# Patient Record
Sex: FEMALE | Race: BLACK OR AFRICAN AMERICAN | NOT HISPANIC OR LATINO | Employment: UNEMPLOYED | URBAN - METROPOLITAN AREA
[De-identification: names, ages, dates, MRNs, and addresses within clinical notes are randomized per-mention and may not be internally consistent; named-entity substitution may affect disease eponyms.]

---

## 2021-06-08 PROBLEM — S31.139A GUNSHOT WOUND OF ABDOMEN: Status: ACTIVE | Noted: 2021-06-08

## 2021-06-08 PROBLEM — S31.109A PENETRATING ABDOMINAL TRAUMA: Status: ACTIVE | Noted: 2021-06-08

## 2021-06-08 PROBLEM — Z98.890 STATUS POST EXPLORATORY LAPAROTOMY: Status: ACTIVE | Noted: 2021-06-08

## 2021-06-09 PROBLEM — R57.8 HEMORRHAGIC SHOCK: Status: ACTIVE | Noted: 2021-06-08

## 2021-06-12 PROBLEM — R45.1 AGITATION: Status: ACTIVE | Noted: 2021-06-12

## 2021-06-13 PROBLEM — R57.8 HEMORRHAGIC SHOCK: Status: RESOLVED | Noted: 2021-06-08 | Resolved: 2021-06-13

## 2021-06-15 PROBLEM — R50.9 FEVER: Status: ACTIVE | Noted: 2021-06-15

## 2021-06-15 PROBLEM — E87.6 HYPOKALEMIA: Status: ACTIVE | Noted: 2021-06-15

## 2021-06-15 PROBLEM — Z90.49 S/P SMALL BOWEL RESECTION: Status: ACTIVE | Noted: 2021-06-15

## 2021-06-16 PROBLEM — D62 ACUTE BLOOD LOSS ANEMIA: Status: ACTIVE | Noted: 2021-06-16

## 2021-06-16 PROBLEM — R45.1 AGITATION: Status: RESOLVED | Noted: 2021-06-12 | Resolved: 2021-06-16

## 2021-06-16 PROBLEM — R50.9 FEVER: Status: RESOLVED | Noted: 2021-06-15 | Resolved: 2021-06-16

## 2021-06-17 PROBLEM — I26.99 ACUTE PULMONARY EMBOLISM: Status: ACTIVE | Noted: 2021-06-17

## 2021-06-17 PROBLEM — J96.01 ACUTE HYPOXEMIC RESPIRATORY FAILURE: Status: ACTIVE | Noted: 2021-06-17

## 2021-06-18 PROBLEM — Z90.49 S/P SMALL BOWEL RESECTION: Status: RESOLVED | Noted: 2021-06-15 | Resolved: 2021-06-18

## 2021-06-18 PROBLEM — Z98.890 STATUS POST EXPLORATORY LAPAROTOMY: Status: RESOLVED | Noted: 2021-06-08 | Resolved: 2021-06-18

## 2021-06-20 PROBLEM — R11.0 NAUSEA: Status: ACTIVE | Noted: 2021-06-20

## 2021-06-24 PROBLEM — R19.8 ABNORMAL FINDINGS ON ESOPHAGOGASTRODUODENOSCOPY (EGD): Status: ACTIVE | Noted: 2021-06-24

## 2021-06-24 PROBLEM — K56.7 ILEUS: Status: ACTIVE | Noted: 2021-06-24

## 2021-06-24 PROBLEM — Z78.9: Status: ACTIVE | Noted: 2021-06-24

## 2021-06-25 PROBLEM — R19.8 ABNORMAL FINDINGS ON ESOPHAGOGASTRODUODENOSCOPY (EGD): Status: RESOLVED | Noted: 2021-06-24 | Resolved: 2021-06-25

## 2021-06-25 PROBLEM — Z78.9: Status: RESOLVED | Noted: 2021-06-24 | Resolved: 2021-06-25

## 2021-06-25 PROBLEM — K56.7 ILEUS: Status: RESOLVED | Noted: 2021-06-24 | Resolved: 2021-06-25

## 2021-06-25 PROBLEM — S31.109A PENETRATING ABDOMINAL TRAUMA: Status: RESOLVED | Noted: 2021-06-08 | Resolved: 2021-06-25

## 2021-06-25 PROBLEM — K92.1 MELENA: Status: ACTIVE | Noted: 2021-06-25

## 2021-06-26 PROBLEM — E87.70 VOLUME OVERLOAD: Status: ACTIVE | Noted: 2021-06-26

## 2021-06-26 PROBLEM — R79.89 ELEVATED LFTS: Status: ACTIVE | Noted: 2021-06-26

## 2021-06-26 PROBLEM — E83.42 HYPOMAGNESEMIA: Status: ACTIVE | Noted: 2021-06-26

## 2021-06-28 PROBLEM — R00.9 ELEVATED HEART RATE WITH ELEVATED BLOOD PRESSURE WITHOUT DIAGNOSIS OF HYPERTENSION: Status: ACTIVE | Noted: 2021-06-28

## 2021-06-28 PROBLEM — R11.0 NAUSEA: Status: RESOLVED | Noted: 2021-06-20 | Resolved: 2021-06-28

## 2021-06-28 PROBLEM — E87.6 HYPOKALEMIA: Status: RESOLVED | Noted: 2021-06-15 | Resolved: 2021-06-28

## 2021-06-28 PROBLEM — R03.0 ELEVATED HEART RATE WITH ELEVATED BLOOD PRESSURE WITHOUT DIAGNOSIS OF HYPERTENSION: Status: ACTIVE | Noted: 2021-06-28

## 2021-06-28 PROBLEM — J98.11 ATELECTASIS: Status: ACTIVE | Noted: 2021-06-28

## 2021-06-28 PROBLEM — K92.1 MELENA: Status: RESOLVED | Noted: 2021-06-25 | Resolved: 2021-06-28

## 2021-06-28 PROBLEM — R50.9 FEVER: Status: RESOLVED | Noted: 2021-06-15 | Resolved: 2021-06-28

## 2021-06-28 PROBLEM — E78.1 HYPERTRIGLYCERIDEMIA: Status: ACTIVE | Noted: 2021-06-28

## 2021-06-29 PROBLEM — R82.998 URINE LEUKOCYTES: Status: ACTIVE | Noted: 2021-06-29

## 2021-06-29 PROBLEM — R94.31 QT PROLONGATION: Status: ACTIVE | Noted: 2021-06-29

## 2021-06-29 PROBLEM — E87.70 VOLUME OVERLOAD: Status: RESOLVED | Noted: 2021-06-26 | Resolved: 2021-06-29

## 2021-06-30 PROBLEM — R53.81 PHYSICAL DECONDITIONING: Status: ACTIVE | Noted: 2021-06-30

## 2021-06-30 PROBLEM — E83.42 HYPOMAGNESEMIA: Status: RESOLVED | Noted: 2021-06-26 | Resolved: 2021-06-30

## 2021-06-30 PROBLEM — S31.109A PENETRATING ABDOMINAL TRAUMA: Status: ACTIVE | Noted: 2021-06-30

## 2021-06-30 PROBLEM — Z79.899 DVT PROPHYLAXIS: Status: ACTIVE | Noted: 2021-06-30
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
## 2022-04-27 PROBLEM — Z93.3 STATUS POST HARTMANN PROCEDURE: Status: ACTIVE | Noted: 2022-04-27

## 2023-01-17 PROBLEM — Z98.890 HISTORY OF COLOSTOMY REVERSAL: Status: ACTIVE | Noted: 2023-01-17

## 2023-01-18 ENCOUNTER — PATIENT OUTREACH (OUTPATIENT)
Dept: ADMINISTRATIVE | Facility: CLINIC | Age: 23
End: 2023-01-18

## 2023-01-18 DIAGNOSIS — Z98.890 HISTORY OF COLOSTOMY REVERSAL: Primary | ICD-10-CM

## 2023-01-18 NOTE — PROGRESS NOTES
C3 [facility] post [facility] discharge follow up call. The patient does not have a scheduled HOSFU appointment with Bettie Mcarthur NP within 5-7 days post hospital discharge date 1/17/23. C3 nurse was unable to schedule HOSFU appointment in [facility].    Message sent to PCP staff requesting they contact patient and schedule follow up appointment.

## 2023-01-22 ENCOUNTER — NURSE TRIAGE (OUTPATIENT)
Dept: ADMINISTRATIVE | Facility: CLINIC | Age: 23
End: 2023-01-22

## 2023-01-22 NOTE — TELEPHONE ENCOUNTER
Mother calling in on behalf of pt. States pt had surgery on Thursday. States pt has not been able to keep anything down and is reporting 10/10 pain and crying with pt. States pt is weak and can barely stand. Mother asking if pt should go to ED. Mother advised based on symptoms, she should go to ED but if pt is too weak and not able to stand or get to POV, call 911 for ambulance assistance. Mother verbalized understanding.   Reason for Disposition   [1] Drinking very little AND [2] dehydration suspected (e.g., no urine > 12 hours, very dry mouth, very lightheaded)    Additional Information   Negative: Sounds like a life-threatening emergency to the triager    Protocols used: Post-Op Symptoms and [id number]

## 2023-01-25 PROBLEM — K65.1 INTRAPERITONEAL ABSCESS: Status: ACTIVE | Noted: 2023-01-25
